# Patient Record
Sex: FEMALE | Race: BLACK OR AFRICAN AMERICAN | Employment: OTHER | URBAN - METROPOLITAN AREA
[De-identification: names, ages, dates, MRNs, and addresses within clinical notes are randomized per-mention and may not be internally consistent; named-entity substitution may affect disease eponyms.]

---

## 2020-07-29 ENCOUNTER — IP HISTORICAL/CONVERTED ENCOUNTER (OUTPATIENT)
Dept: OTHER | Age: 60
End: 2020-07-29

## 2020-08-05 ENCOUNTER — OFFICE VISIT (OUTPATIENT)
Dept: CARDIOLOGY CLINIC | Age: 60
End: 2020-08-05
Payer: MEDICAID

## 2020-08-05 VITALS
HEIGHT: 62 IN | DIASTOLIC BLOOD PRESSURE: 78 MMHG | HEART RATE: 58 BPM | BODY MASS INDEX: 16.65 KG/M2 | RESPIRATION RATE: 16 BRPM | OXYGEN SATURATION: 100 % | SYSTOLIC BLOOD PRESSURE: 126 MMHG | WEIGHT: 90.5 LBS

## 2020-08-05 DIAGNOSIS — I10 ESSENTIAL HYPERTENSION: ICD-10-CM

## 2020-08-05 DIAGNOSIS — I26.99 ACUTE PULMONARY EMBOLISM, UNSPECIFIED PULMONARY EMBOLISM TYPE, UNSPECIFIED WHETHER ACUTE COR PULMONALE PRESENT (HCC): ICD-10-CM

## 2020-08-05 DIAGNOSIS — I31.39 PERICARDIAL EFFUSION: Primary | ICD-10-CM

## 2020-08-05 PROCEDURE — 93000 ELECTROCARDIOGRAM COMPLETE: CPT | Performed by: INTERNAL MEDICINE

## 2020-08-05 PROCEDURE — 99204 OFFICE O/P NEW MOD 45 MIN: CPT | Performed by: INTERNAL MEDICINE

## 2020-08-05 RX ORDER — BUMETANIDE 1 MG/1
1 TABLET ORAL DAILY
COMMUNITY
Start: 2020-07-31

## 2020-08-05 RX ORDER — ENALAPRIL MALEATE 10 MG/1
10 TABLET ORAL DAILY
COMMUNITY
Start: 2020-06-25

## 2020-08-05 RX ORDER — APIXABAN 5 MG/1
5 TABLET, FILM COATED ORAL DAILY
COMMUNITY
Start: 2020-08-03

## 2020-08-05 NOTE — PROGRESS NOTES
Azeem Ervin, [facility]    Subjective/HPI:     Solomon Chery is a 63 y.o. female is here to establish care. She has a PMHx of sickle cell anemia and HTN. She is here with her son. History is also obtained from her daughter-in-law over cell phone, as the patient is not a reliable historian. Per daughter-in-law, she was recently admitted to [facility] for pulmonary embolism, pancreatitis and fluid around her heart. Apparently she was recommend cardiac catheterization, but the patient declined. She was admitted from 7/31 to 8/1. She was also recommended to undergo an MRI but the patient is too claustrophobic, so she was recommended to get an open MRI. This has been scheduled for sometime next week. There are no records available to review. The son brings an AVS which only states discharge diagnoses, which include: Pancreatitis, pericardial effusion, pelvic ascites, pulmonary embolism, and hypertension. Patient's son notes that the patient has been on Eliquis therapy for more than a year. This was prescribed by her sickle cell doctor for an irregular heart rhythm. There has not been an interruption in her medication regimen. She follows with [facility] for her sickle cell anemia. She says her PCP is [facility].  She has been on supplemental O2 for over a year, after discharge from [facility]. Her daughter-in-law states she has severe COPD, however she does not see a pulmonologist.    The patient currently complains of nausea and abdominal pain. They have no follow up scheduled with GI. She denies chest pain symptoms. She denies shortness of breath so long as she uses her oxygen. She denies lower extremity edema. The patient and family deny previous history of cardiac disease, heart attack, or heart failure. She is not entirely certain about the diagnosis of irregular heart rhythm. She denies previous history of blood clots. She has been on hypertension therapy for a long time.     She was a smoker but quit after her most recently hospitalization. PCP Provider  Anupama Huitron MD    Past Medical History:   Diagnosis Date    Arthritis     COPD (chronic obstructive pulmonary disease) ([facility]     on chronic O2 2L    Headache(784.0)     HTN (hypertension)     Mitral regurgitation     [facility] 2017 -- right lower lobe; 2018 -- bilateral lower lobe subsegmental PE; diagnosed at [facility]    Pulmonary hypertension (HCC)     Sickle cell anemia ([facility] 1966    follows with Heme/Onc at [facility]    Tricuspid regurgitation         Past Surgical History:   Procedure Laterality Date    HX CHOLECYSTECTOMY          family history includes Diabetes in her mother.      Social History     Socioeconomic History    Marital status: SINGLE     Spouse name: Not on file    Number of children: Not on file    Years of education: Not on file    Highest education level: Not on file   Occupational History    Not on file   Social Needs    Financial resource strain: Not on file    Food insecurity     Worry: Not on file     Inability: Not on file    Transportation needs     Medical: Not on file     Non-medical: Not on file   Tobacco Use    Smoking status: Former Smoker     Types: Cigarettes     Last attempt to quit: 2020     Years since quittin.0    Smokeless tobacco: Never Used   Substance and Sexual Activity    Alcohol use: No    Drug use: Never    Sexual activity: Not on file   Lifestyle    Physical activity     Days per week: Not on file     Minutes per session: Not on file    Stress: Not on file   Relationships    Social connections     Talks on phone: Not on file     Gets together: Not on file     Attends Church service: Not on file     Active member of club or organization: Not on file     Attends meetings of clubs or organizations: Not on file     Relationship status: Not on file    Intimate partner violence     Fear of current or ex partner: Not on file Emotionally abused: Not on file     Physically abused: Not on file     Forced sexual activity: Not on file   Other Topics Concern    Not on file   Social History Narrative    Not on file       Allergies   Allergen Reactions    Codeine Nausea Only and Other (comments)        Outpatient Encounter Medications as of 8/5/2020   Medication Sig Dispense Refill    Eliquis 5 mg tablet Take 5 mg by mouth daily.  bumetanide (BUMEX) 1 mg tablet Take 1 mg by mouth daily.  enalapril (VASOTEC) 10 mg tablet Take 10 mg by mouth daily.  Oxygen 2 liters O2 cont      oxycodone (OXY-IR) 30 mg immediate release tablet Take 30 mg by mouth every four (4) hours as needed.  hydroxyurea (HYDREA) 500 mg capsule Take 500 mg by mouth daily.  FOLIC ACID PO Take  by mouth daily.  alprazolam (XANAX) 1 mg tablet Take 1 mg by mouth nightly as needed.  [DISCONTINUED] oxycodone CR (OXYCONTIN) 80 mg CR tablet Take  by mouth every twelve (12) hours.  [DISCONTINUED] furosemide (LASIX) 40 mg tablet Take  by mouth daily.  [DISCONTINUED] AMITRIPTYLINE HCL (AMITRIPTYLINE PO) Take 50 mg by mouth nightly.  [DISCONTINUED] cyclobenzaprine (FLEXERIL) 10 mg tablet Take 10 mg by mouth two (2) times daily as needed. Indications: MUSCLE SPASM       No facility-administered encounter medications on file as of 8/5/2020. Review of Symptoms:    Review of Systems   Constitutional: Negative for chills, fever and weight loss. HENT: Negative for nosebleeds. Eyes: Negative for blurred vision and double vision. Respiratory: Negative for cough, shortness of breath and wheezing. Cardiovascular: Negative for chest pain, palpitations, orthopnea, leg swelling and PND. Gastrointestinal: Positive for abdominal pain and nausea. Negative for blood in stool, diarrhea and vomiting. Musculoskeletal: Negative for joint pain. Skin: Negative for rash.    Neurological: Negative for dizziness, tingling and loss of consciousness. Endo/Heme/Allergies: Does not bruise/bleed easily. Physical Exam:      General: Well developed, in no acute distress, cooperative and alert. Thin. Looks older than stated age. HEENT: No carotid bruits, no JVD, trach is midline. Neck Supple, PEERL, EOM intact. Heart:  reg rate and rhythm; normal S1/S2; no murmurs, no gallops or rubs. Respiratory: Clear bilaterally x 4, no wheezing or rales. On supplemental O2 via 2L NC. Abdomen:   Soft, non-tender, no distention, no masses. + BS. Extremities:  Normal cap refill, no cyanosis, atraumatic. No edema. Neuro: A&Ox3, speech clear, gait stable. Skin: Skin color is normal. No rashes or lesions. Non diaphoretic  Vascular: 2+ pulses symmetric in all extremities    Vitals:    08/05/20 1056   BP: 126/78   Pulse: (!) 58   Resp: 16   SpO2: 100%   Weight: 90 lb 8 oz (41.1 kg)   Height: 5' 2\" (1.575 m)       ECG: sinus rhythm; LVH    Cardiology Labs:    No results found for: FLP, CHOL, HDL, VLDL, CHHD, LDLC    No results found for: HBA1C, GMQ7CSND, UQO4QXVG, NGT6ZYEG    No results found for: NA, K, CL, CO2, GLU, BUN, CREA, BUCR, GFRAA, GFRNA, CA, AGAP, TBILI, ALT, AP, TP, ALB, GLOB, AGRAT       Assessment:     Assessment:       ICD-10-CM ICD-9-CM    1. Pericardial effusion  I31.3 423.9 AMB POC EKG ROUTINE W/ 12 LEADS, INTER & REP   2. Acute pulmonary embolism, unspecified pulmonary embolism type, unspecified whether acute cor pulmonale present (HCC)  I26.99 415.19 AMB POC EKG ROUTINE W/ 12 LEADS, INTER & REP   3. Essential hypertension  I10 401.9 AMB POC EKG ROUTINE W/ 12 LEADS, INTER & REP        Plan:     1. Pericardial effusion  Records from [facility] show transthoracic echo done 7/16/2020  She has normal LVEF 55-60%, mildly dilated RA/RV, mild MR, with mod-sev TR, with severe PASP. There is a small pericardial effusion noted on echo done 7/16/2020. Will need to obtain records from [facility].   There was no mention of NSTEMI on discharge diagnosis, or cardiomyopathy, so not sure why cardiac catheterization was recommended. There was no mention of stress test being performed at the hospital either. Once records are obtained, will determine need for further testing at that time. For now, she appears euvolemic and we will continue her diuretic regimen. 2. Acute pulmonary embolism, unspecified pulmonary embolism type, unspecified whether acute cor pulmonale present ([address]  Reportedly new PE at [facility]. Patient records from [facility] state hx of right lower lobe subsegmental PE new in 11/2017 -- never started on anti-coagulation therapy. She was also diagnosed with bilateral subsegmental PE in 8/2018, while admitted at [facility] for sickle cell crisis. Was started on Eliquis therapy at that time. Per OV with Heme/Onc at [facility] on 7/20/2020 reports that patient has been taking Eliquis DAILY instead of BID. She is currently taking Eliquis 5 mg BID. Discussed need to see pulmonary for management of her severe COPD. 3. Essential hypertension  BP controlled. Continue anti-hypertensive therapy and low sodium diet    4. Pancreatitis  Presently conservatively managed. Has nausea and pain. No appointment scheduled with GI. Discussed importance of following up with GI for further management of her pancreatitis. Will also need to f/u based on the results of her open MRI study. Discussed with patient today the importance of seeking care from multiple specialists. This is important in treating her various medical problems. Also encouraged her to established with primary care that is not [facility], so that her care can be better coordinated. F/u in 1 month with Dr. Maria C Kirby. Will schedule f/u sooner if records are received before then. Edwin Busby NP       [facility]    8/5/2020         Patient seen, examined by me personally. Plan discussed as detailed. Agree with note as outlined by  NP.  I confirm findings in history and physical exam. No additional findings noted. Agree with plan as outlined above. Provided numbers for pcp, pulmonary and GI to make appointments.     Kalyan Rodriguez MD

## 2020-08-05 NOTE — PROGRESS NOTES
1. Have you been to the ER, urgent care clinic since your last visit? Hospitalized since your last visit? At [facility] and d/c on 7/21/20 for 4 days. 2. Have you seen or consulted any other health care providers outside of the [address] [place] [address] since your last visit? Include any pap smears or colon screening. Seen at [facility] in [place] , in [facility]. Noted PE and Pericardial Effusion. Seen at [facility] on [facility]th. Chief Complaint   Patient presents with    New Patient     had swelling in legs and had fluid around heart     CAN PT EXERCISE AND MOVE AROUND DURING THE DAY.

## 2020-08-12 ENCOUNTER — HOSPITAL ENCOUNTER (OUTPATIENT)
Dept: MRI IMAGING | Age: 60
Discharge: HOME | End: 2020-08-12
Payer: MEDICAID

## 2020-08-12 DIAGNOSIS — K85.90 PANCREATITIS: ICD-10-CM

## 2020-08-12 PROCEDURE — 74181 MRI ABDOMEN W/O CONTRAST: CPT

## 2020-08-13 NOTE — PROGRESS NOTES
Reviewed hospital records from [facility]. She was admitted from 7/29 to 7/31 for acute pancreatitis. She was treated with IV fluids and bowel rest and seen in consultation by GI. They recommended MRI, however patient declined testing, so she was recommended outpatient open MRI at a later time. Her lipase was elevated, but did improve with IV fluids and bowel rest.    She was noted to have pericardial effusion on CXR, thus necessitating cardiology consultation. She had echocardiogram done which showed EF > 70%, with moderate pericardial effusion, no evidence of cardiac tamponade, with pulmonary HTN, PASP 50 mmHg. Cardiology recommendations were to continue Bumex dosing, and no further recommendations were made. She did not have any evidence or clinical indications for NSTEMI, or any indication suggestive need for invasive cardiac procedures. Cardiac status was stable during hospitalization and at discharge. Given her history of PE and sickle anemia, she was seen in consultation by Heme/Onc. Consultation by Heme/Onc stated her sickle cell anemia was stable, they noted she was following routinely with Heme/Onc at [facility]. Recommended to continue present therapy. For PE, they noted this was a chronic problem and not acute and recommended she continue Eliquis therapy. Hospital record, including consultation notes, Abd CT, CXR and Echocardiogram, scanned into the chart.     Natalie Hensley NP  8/13/2020

## 2024-12-06 ENCOUNTER — TELEPHONE (OUTPATIENT)
Dept: PRIMARY CARE CLINIC | Facility: CLINIC | Age: 64
End: 2024-12-06

## 2024-12-06 NOTE — TELEPHONE ENCOUNTER
PT had not established care but has a new  patient appointment set with Dr. Interiano       PT would like a call back from a nurse to discuss a lump near her private area.     Please further assist

## 2025-07-08 ENCOUNTER — APPOINTMENT (OUTPATIENT)
Facility: HOSPITAL | Age: 65
End: 2025-07-08
Payer: MEDICAID

## 2025-07-08 ENCOUNTER — HOSPITAL ENCOUNTER (EMERGENCY)
Facility: HOSPITAL | Age: 65
Discharge: HOME | End: 2025-07-08
Payer: MEDICAID

## 2025-07-08 VITALS
HEART RATE: 75 BPM | SYSTOLIC BLOOD PRESSURE: 185 MMHG | TEMPERATURE: 98.5 F | OXYGEN SATURATION: 100 % | HEIGHT: 62 IN | DIASTOLIC BLOOD PRESSURE: 79 MMHG | BODY MASS INDEX: 20.24 KG/M2 | RESPIRATION RATE: 16 BRPM | WEIGHT: 110 LBS

## 2025-07-08 DIAGNOSIS — M79.89 SWELLING OF BOTH LOWER EXTREMITIES: Primary | ICD-10-CM

## 2025-07-08 DIAGNOSIS — D57.1 SICKLE CELL DISEASE WITHOUT CRISIS (HCC): ICD-10-CM

## 2025-07-08 DIAGNOSIS — M71.21 SYNOVIAL CYST OF RIGHT KNEE: ICD-10-CM

## 2025-07-08 DIAGNOSIS — R79.89 ELEVATED BRAIN NATRIURETIC PEPTIDE (BNP) LEVEL: ICD-10-CM

## 2025-07-08 LAB
ALBUMIN SERPL-MCNC: 2.9 G/DL (ref 3.5–5)
ALBUMIN/GLOB SERPL: 0.5 (ref 1.1–2.2)
ALP SERPL-CCNC: 110 U/L (ref 45–117)
ALT SERPL-CCNC: 40 U/L (ref 12–78)
ANION GAP SERPL CALC-SCNC: 7 MMOL/L (ref 2–12)
APTT PPP: 31.7 SEC (ref 21.2–34.1)
AST SERPL W P-5'-P-CCNC: 41 U/L (ref 15–37)
BASOPHILS # BLD: 0 K/UL (ref 0–0.1)
BASOPHILS NFR BLD: 0 % (ref 0–1)
BILIRUB SERPL-MCNC: 0.7 MG/DL (ref 0.2–1)
BNP SERPL-MCNC: 470 PG/ML
BUN SERPL-MCNC: 30 MG/DL (ref 6–20)
BUN/CREAT SERPL: 10 (ref 12–20)
CA-I BLD-MCNC: 10 MG/DL (ref 8.5–10.1)
CHLORIDE SERPL-SCNC: 104 MMOL/L (ref 97–108)
CO2 SERPL-SCNC: 26 MMOL/L (ref 21–32)
CREAT SERPL-MCNC: 2.87 MG/DL (ref 0.55–1.02)
D DIMER PPP FEU-MCNC: 1.2 UG/ML(FEU)
DIFFERENTIAL METHOD BLD: ABNORMAL
ECHO BSA: 1.48 M2
EOSINOPHIL # BLD: 0 K/UL (ref 0–0.4)
EOSINOPHIL NFR BLD: 0 % (ref 0–7)
ERYTHROCYTE [DISTWIDTH] IN BLOOD BY AUTOMATED COUNT: 23.7 % (ref 11.5–14.5)
GLOBULIN SER CALC-MCNC: 6 G/DL (ref 2–4)
GLUCOSE SERPL-MCNC: 106 MG/DL (ref 65–100)
HCT VFR BLD AUTO: 23.8 % (ref 35–47)
HGB BLD-MCNC: 7.4 G/DL (ref 11.5–16)
IMM GRANULOCYTES # BLD AUTO: 0 K/UL
IMM GRANULOCYTES NFR BLD AUTO: 0 %
INR PPP: 1.2 (ref 0.9–1.1)
LYMPHOCYTES # BLD: 2.44 K/UL (ref 0.8–3.5)
LYMPHOCYTES NFR BLD: 47 % (ref 12–49)
MCH RBC QN AUTO: 28.2 PG (ref 26–34)
MCHC RBC AUTO-ENTMCNC: 31.1 G/DL (ref 30–36.5)
MCV RBC AUTO: 90.8 FL (ref 80–99)
MONOCYTES # BLD: 0.16 K/UL (ref 0–1)
MONOCYTES NFR BLD: 3 % (ref 5–13)
NEUTS SEG # BLD: 2.6 K/UL (ref 1.8–8)
NEUTS SEG NFR BLD: 50 % (ref 32–75)
NRBC # BLD: 0.3 K/UL (ref 0–0.01)
NRBC BLD-RTO: 5.7 PER 100 WBC
PLATELET # BLD AUTO: 168 K/UL (ref 150–400)
PMV BLD AUTO: 11.6 FL (ref 8.9–12.9)
POTASSIUM SERPL-SCNC: 4.9 MMOL/L (ref 3.5–5.1)
PROT SERPL-MCNC: 8.9 G/DL (ref 6.4–8.2)
PROTHROMBIN TIME: 14.8 SEC (ref 11.9–14.1)
RBC # BLD AUTO: 2.62 M/UL (ref 3.8–5.2)
RBC MORPH BLD: ABNORMAL
RETICS # AUTO: 0.07 M/UL (ref 0.02–0.08)
RETICS/RBC NFR AUTO: 2.6 % (ref 0.7–2.1)
SODIUM SERPL-SCNC: 137 MMOL/L (ref 136–145)
THERAPEUTIC RANGE: NORMAL SEC (ref 82–109)
TROPONIN I SERPL HS-MCNC: 9 NG/L (ref 0–51)
WBC # BLD AUTO: 5.2 K/UL (ref 3.6–11)

## 2025-07-08 PROCEDURE — 85025 COMPLETE CBC W/AUTO DIFF WBC: CPT

## 2025-07-08 PROCEDURE — 80053 COMPREHEN METABOLIC PANEL: CPT

## 2025-07-08 PROCEDURE — 36415 COLL VENOUS BLD VENIPUNCTURE: CPT

## 2025-07-08 PROCEDURE — 85610 PROTHROMBIN TIME: CPT

## 2025-07-08 PROCEDURE — 83880 ASSAY OF NATRIURETIC PEPTIDE: CPT

## 2025-07-08 PROCEDURE — 85730 THROMBOPLASTIN TIME PARTIAL: CPT

## 2025-07-08 PROCEDURE — 84484 ASSAY OF TROPONIN QUANT: CPT

## 2025-07-08 PROCEDURE — 99284 EMERGENCY DEPT VISIT MOD MDM: CPT

## 2025-07-08 PROCEDURE — 85379 FIBRIN DEGRADATION QUANT: CPT

## 2025-07-08 PROCEDURE — 85045 AUTOMATED RETICULOCYTE COUNT: CPT

## 2025-07-08 PROCEDURE — 93971 EXTREMITY STUDY: CPT

## 2025-07-08 ASSESSMENT — LIFESTYLE VARIABLES
HOW OFTEN DO YOU HAVE A DRINK CONTAINING ALCOHOL: NEVER
HOW MANY STANDARD DRINKS CONTAINING ALCOHOL DO YOU HAVE ON A TYPICAL DAY: PATIENT DOES NOT DRINK

## 2025-07-08 ASSESSMENT — PAIN DESCRIPTION - ORIENTATION: ORIENTATION: RIGHT;LEFT

## 2025-07-08 ASSESSMENT — PAIN - FUNCTIONAL ASSESSMENT: PAIN_FUNCTIONAL_ASSESSMENT: 0-10

## 2025-07-08 ASSESSMENT — PAIN DESCRIPTION - LOCATION: LOCATION: LEG

## 2025-07-08 ASSESSMENT — PAIN SCALES - GENERAL: PAINLEVEL_OUTOF10: 6

## 2025-07-08 NOTE — ED PROVIDER NOTES
[facility] EMERGENCY DEPT  EMERGENCY DEPARTMENT HISTORY AND PHYSICAL EXAM      Date of evaluation: 2025  Patient Name: Sheila Nesbitt  Birthdate 1960  MRN: [id number]  ED Provider: SHAHLA Marion   Note Started: 7:49 PM EDT 25    HISTORY OF PRESENT ILLNESS     Chief Complaint   Patient presents with    Leg Swelling    Leg Pain       History Provided By: Patient, daughter/son     HPI: Sheila Nesbitt is a 64 y.o. female with past medical history significant for COPD on 3L oxygen at home, HTN, PE on eliquis, pulmonary HTN, and sickle cell anemia who presents to the ED complaining of bilateral lower extremity swelling and a \"knot\" behind the right knee x 1 week. She denies any trauma or injury to either leg. She states that the right leg was warm and painful yesterday and applying an ice pack did provide some relief of her pain. She denies any fever/chills, chest pain, or new shortness of breath currently. Her son is with her at bedside and provides some of the history as well.     PAST MEDICAL HISTORY   Past Medical History:  Past Medical History:   Diagnosis Date    Arthritis     COPD (chronic obstructive pulmonary disease) ([facility])     on chronic O2 2L    Headache(784.0)     HTN (hypertension)     Mitral regurgitation     Pulmonary embolism (HCC) 2017 -- right lower lobe; 2018 -- bilateral lower lobe subsegmental PE; diagnosed at [facility]    Pulmonary hypertension ([facility])     Sickle cell anemia ([facility]) 1966    follows with Heme/Onc at [facility]    Tricuspid regurgitation        Past Surgical History:  Past Surgical History:   Procedure Laterality Date    CHOLECYSTECTOMY         Family History:  Family History   Problem Relation Age of Onset    Diabetes Mother        Social History:  Social History     Tobacco Use    Smoking status: Former     Current packs/day: 0.00     Types: Cigarettes     Quit date: 2020     Years since quittin.9    Smokeless tobacco: Never   Substance Use Topics    Alcohol use: No    Drug  use: Never       Allergies:  Allergies   Allergen Reactions    Codeine Nausea Only and Other (See Comments)       PCP: Karina Jackson MD    Current Meds:   No current facility-administered medications for this encounter.     Current Outpatient Medications   Medication Sig Dispense Refill    ALPRAZolam (XANAX) 1 MG tablet Take 1 tablet by mouth nightly as needed for Sleep. Max Daily Amount: 1 mg      ELIQUIS 5 MG TABS tablet Take 1 tablet by mouth 2 times daily      amLODIPine (NORVASC) 5 MG tablet Take 1 tablet by mouth daily      vitamin D (ERGOCALCIFEROL) 1.25 MG (98796 UT) CAPS capsule Take 1 capsule by mouth once a week      folic acid (FOLVITE) 1 MG tablet Take 1 tablet by mouth daily      hydroxyurea (HYDREA) 500 MG chemo capsule Take 1 capsule by mouth daily      hydrOXYzine HCl (ATARAX) 10 MG tablet Take 1 tablet by mouth 2 times daily as needed      losartan (COZAAR) 100 MG tablet Take 1 tablet by mouth daily      nicotine (NICODERM CQ) 7 MG/24HR Place 1 patch onto the skin every 24 hours      nicotine (NICODERM CQ) 21 MG/24HR Place 1 patch onto the skin every 24 hours      nicotine (NICODERM CQ) 14 MG/24HR Place 1 patch onto the skin every 24 hours      oxyCODONE (OXY-IR) 30 MG immediate release tablet TAKE 1 TO 2 TABLETS BY MOUTH EVERY 6 HOURS AS NEEDED FOR SEVERE PAIN      OXYCONTIN 40 MG extended release tablet 1 tablet.      zaleplon (SONATA) 10 MG capsule Take 1 capsule by mouth nightly. Max Daily Amount: 10 mg      OXYGEN 2 L by Other route         Social Determinants of Health:   Social Drivers of Health     Tobacco Use: Medium Risk (8/5/2020)    Received from [facility]  (prior to 6/17/2023)    Patient History     Smoking Tobacco Use: Former     Smokeless Tobacco Use: Never     Passive Exposure: Not on file   Alcohol Use: Not At Risk (7/8/2025)    AUDIT-C     Frequency of Alcohol Consumption: Never     Average Number of Drinks: Patient does not drink     Frequency of Binge

## 2025-07-08 NOTE — ED TRIAGE NOTES
Patient presents with bilateral leg swelling and knot on the back of the right leg. Hx of gout, states it doesn't feel exactly like that.     Swelling started a week ago, knot appeared/noticed it a couple days ago.     Right leg has pain/swelling and was reported hot the other day, takes eliquis

## 2025-07-09 NOTE — DISCHARGE INSTRUCTIONS
Thank you for choosing our Emergency Department for your care.  It is our privilege to care for you in your time of need.  In the next several days, you may receive a survey via email or mailed to your home about your experience with our team.  We would greatly appreciate you taking a few minutes to complete the survey, as we use this information to learn what we have done well and what we could be doing better. Thank you for trusting us with your care!    Below you will find a list of your tests from today's visit.   Labs and Radiology Studies  Recent Results (from the past 12 hours)   CBC with Auto Differential    Collection Time: 07/08/25  8:49 PM   Result Value Ref Range    WBC 5.2 3.6 - 11.0 K/uL    RBC 2.62 (L) 3.80 - 5.20 M/uL    Hemoglobin 7.4 (L) 11.5 - 16.0 g/dL    Hematocrit 23.8 (L) 35.0 - 47.0 %    MCV 90.8 80.0 - 99.0 FL    MCH 28.2 26.0 - 34.0 PG    MCHC 31.1 30.0 - 36.5 g/dL    RDW 23.7 (H) 11.5 - 14.5 %    Platelets 168 150 - 400 K/uL    MPV 11.6 8.9 - 12.9 FL    Nucleated RBCs 5.7 (H) 0.0  WBC    nRBC 0.30 (H) 0.00 - 0.01 K/uL    Neutrophils % PENDING %    Lymphocytes % PENDING %    Monocytes % PENDING %    Eosinophils % PENDING %    Basophils % PENDING %    Immature Granulocytes % PENDING %    Neutrophils Absolute PENDING K/UL    Lymphocytes Absolute PENDING K/UL    Monocytes Absolute PENDING K/UL    Eosinophils Absolute PENDING K/UL    Basophils Absolute PENDING K/UL    Immature Granulocytes Absolute PENDING K/UL    Differential Type PENDING    Comprehensive Metabolic Panel    Collection Time: 07/08/25  8:49 PM   Result Value Ref Range    Sodium 137 136 - 145 mmol/L    Potassium 4.9 3.5 - 5.1 mmol/L    Chloride 104 97 - 108 mmol/L    CO2 26 21 - 32 mmol/L    Anion Gap 7 2 - 12 mmol/L    Glucose 106 (H) 65 - 100 mg/dL    BUN 30 (H) 6 - 20 mg/dL    Creatinine 2.87 (H) 0.55 - 1.02 mg/dL    BUN/Creatinine Ratio 10 (L) 12 - 20      Est, Glom Filt Rate 18 (L) >60 ml/min/1.73m2    Calcium